# Patient Record
Sex: FEMALE | Race: AMERICAN INDIAN OR ALASKA NATIVE | NOT HISPANIC OR LATINO | Employment: FULL TIME | URBAN - METROPOLITAN AREA
[De-identification: names, ages, dates, MRNs, and addresses within clinical notes are randomized per-mention and may not be internally consistent; named-entity substitution may affect disease eponyms.]

---

## 2021-02-25 PROBLEM — O26.853 SPOTTING COMPLICATING PREGNANCY, THIRD TRIMESTER: Status: ACTIVE | Noted: 2021-02-25

## 2021-03-03 PROBLEM — O26.893 ABDOMINAL PAIN DURING PREGNANCY IN THIRD TRIMESTER: Status: ACTIVE | Noted: 2021-03-03

## 2021-03-03 PROBLEM — R10.9 ABDOMINAL PAIN DURING PREGNANCY IN THIRD TRIMESTER: Status: ACTIVE | Noted: 2021-03-03

## 2021-03-14 PROBLEM — M25.559 HIP PAIN: Status: ACTIVE | Noted: 2021-03-14

## 2021-03-27 PROBLEM — O47.00 PRETERM UTERINE CONTRACTIONS: Status: ACTIVE | Noted: 2021-03-27

## 2021-04-20 PROBLEM — O47.00 PRETERM UTERINE CONTRACTIONS: Status: RESOLVED | Noted: 2021-03-27 | Resolved: 2021-04-20

## 2021-04-20 PROBLEM — O26.893 ABDOMINAL PAIN DURING PREGNANCY IN THIRD TRIMESTER: Status: RESOLVED | Noted: 2021-03-03 | Resolved: 2021-04-20

## 2021-04-20 PROBLEM — O26.853 SPOTTING COMPLICATING PREGNANCY, THIRD TRIMESTER: Status: RESOLVED | Noted: 2021-02-25 | Resolved: 2021-04-20

## 2021-04-20 PROBLEM — M25.559 HIP PAIN: Status: RESOLVED | Noted: 2021-03-14 | Resolved: 2021-04-20

## 2021-04-20 PROBLEM — R10.9 ABDOMINAL PAIN DURING PREGNANCY IN THIRD TRIMESTER: Status: RESOLVED | Noted: 2021-03-03 | Resolved: 2021-04-20

## 2021-05-06 ENCOUNTER — PATIENT MESSAGE (OUTPATIENT)
Dept: RESEARCH | Facility: HOSPITAL | Age: 21
End: 2021-05-06

## 2022-04-10 ENCOUNTER — HISTORICAL (OUTPATIENT)
Dept: ADMINISTRATIVE | Facility: HOSPITAL | Age: 22
End: 2022-04-10

## 2022-04-29 VITALS — HEIGHT: 66 IN | WEIGHT: 196.19 LBS | BODY MASS INDEX: 31.53 KG/M2

## 2023-06-18 ENCOUNTER — HOSPITAL ENCOUNTER (EMERGENCY)
Facility: HOSPITAL | Age: 23
Discharge: HOME | End: 2023-06-18
Attending: FAMILY MEDICINE
Payer: MEDICAID

## 2023-06-18 VITALS
DIASTOLIC BLOOD PRESSURE: 77 MMHG | OXYGEN SATURATION: 97 % | BODY MASS INDEX: 33.32 KG/M2 | HEIGHT: 65 IN | SYSTOLIC BLOOD PRESSURE: 109 MMHG | HEART RATE: 117 BPM | TEMPERATURE: 100 F | WEIGHT: 200 LBS | RESPIRATION RATE: 20 BRPM

## 2023-06-18 DIAGNOSIS — J02.0 STREP PHARYNGITIS: Primary | ICD-10-CM

## 2023-06-18 LAB
B-HCG SERPL QL: NEGATIVE
FLUAV AG UPPER RESP QL IA.RAPID: NOT DETECTED
FLUBV AG UPPER RESP QL IA.RAPID: NOT DETECTED
SARS-COV-2 RNA RESP QL NAA+PROBE: NOT DETECTED
STREP A PCR (OHS): DETECTED

## 2023-06-18 PROCEDURE — 25000003 PHARM REV CODE 250: Performed by: FAMILY MEDICINE

## 2023-06-18 PROCEDURE — 99284 EMERGENCY DEPT VISIT MOD MDM: CPT

## 2023-06-18 PROCEDURE — 87651 STREP A DNA AMP PROBE: CPT | Performed by: FAMILY MEDICINE

## 2023-06-18 PROCEDURE — 0240U COVID/FLU A&B PCR: CPT | Performed by: FAMILY MEDICINE

## 2023-06-18 PROCEDURE — 81025 URINE PREGNANCY TEST: CPT | Performed by: FAMILY MEDICINE

## 2023-06-18 RX ORDER — ONDANSETRON 4 MG/1
4 TABLET, ORALLY DISINTEGRATING ORAL EVERY 6 HOURS PRN
Qty: 10 TABLET | Refills: 0 | Status: SHIPPED | OUTPATIENT
Start: 2023-06-18 | End: 2023-06-23

## 2023-06-18 RX ORDER — AMOXICILLIN 500 MG/1
1000 CAPSULE ORAL DAILY
Qty: 20 CAPSULE | Refills: 0 | Status: SHIPPED | OUTPATIENT
Start: 2023-06-18 | End: 2023-06-28

## 2023-06-18 RX ORDER — ONDANSETRON 4 MG/1
4 TABLET, ORALLY DISINTEGRATING ORAL
Status: COMPLETED | OUTPATIENT
Start: 2023-06-18 | End: 2023-06-18

## 2023-06-18 RX ADMIN — ONDANSETRON 4 MG: 4 TABLET, ORALLY DISINTEGRATING ORAL at 08:06

## 2023-06-18 NOTE — Clinical Note
"Georgina"Brittney Larsen was seen and treated in our emergency department on 6/18/2023.  She may return to work on 06/22/2023.       If you have any questions or concerns, please don't hesitate to call.      Sebastian Viveros MD"

## 2023-06-19 NOTE — ED PROVIDER NOTES
Encounter Date: 6/18/2023       History     Chief Complaint   Patient presents with    Generalized Body Aches     Pt states she started feeling lethargic, nauseated and feeling a slight sore throat this afternoon. Took tylenol around 12pm. Low grade temp in triage. States he daughter was tested on Friday for covid and strep and was positive for both.      Patient is a 22-year-old female presents emergency room complaints of fatigue, nausea, low-grade fever, body aches.  Symptoms began around 12:00 p.m..  Reports that her daughter was recently diagnosed with strep and COVID.  Patient reports nonspecific abdominal cramping as well as having diarrhea.  Denies any chronic medical problems.    The history is provided by the patient.   Review of patient's allergies indicates:  No Known Allergies  History reviewed. No pertinent past medical history.  Past Surgical History:   Procedure Laterality Date    ANKLE SURGERY       Family History   Problem Relation Age of Onset    Hypertension Mother     Cancer Mother     Hypertension Father     Stroke Maternal Grandmother     Cancer Maternal Grandmother     Seizures Maternal Grandmother     Hypertension Paternal Grandmother      Social History     Tobacco Use    Smoking status: Never    Smokeless tobacco: Never   Substance Use Topics    Alcohol use: No    Drug use: No     Review of Systems   Constitutional:  Positive for chills, fatigue and fever.   HENT:  Positive for sore throat. Negative for ear pain and rhinorrhea.    Eyes:  Negative for photophobia and pain.   Respiratory:  Negative for cough, shortness of breath and wheezing.    Cardiovascular:  Negative for chest pain.   Gastrointestinal:  Positive for diarrhea, nausea and vomiting. Negative for abdominal pain.   Genitourinary:  Negative for dysuria.   Neurological:  Negative for dizziness, weakness and headaches.   All other systems reviewed and are negative.    Physical Exam     Initial Vitals [06/18/23 1957]   BP Pulse  Resp Temp SpO2   109/77 (!) 117 20 100.2 °F (37.9 °C) 97 %      MAP       --         Physical Exam    Nursing note and vitals reviewed.  Constitutional: She appears well-developed and well-nourished. No distress.   HENT:   Head: Normocephalic and atraumatic.   Mild pharyngeal erythema bilaterally with bilateral tonsillar exudates   Eyes: Conjunctivae and EOM are normal. Pupils are equal, round, and reactive to light.   Neck: Neck supple.   Normal range of motion.  Cardiovascular:  Normal rate, regular rhythm, normal heart sounds and intact distal pulses.           Pulmonary/Chest: Breath sounds normal. No respiratory distress. She has no wheezes. She has no rhonchi. She has no rales.   Abdominal: Abdomen is soft. Bowel sounds are normal. She exhibits no distension. There is no abdominal tenderness. There is no rebound and no guarding.   Musculoskeletal:         General: Normal range of motion.      Cervical back: Normal range of motion and neck supple.     Neurological: She is alert and oriented to person, place, and time.   Skin: Skin is warm and dry. Capillary refill takes less than 2 seconds. No erythema.   Psychiatric: She has a normal mood and affect. Her behavior is normal. Judgment and thought content normal.       ED Course   Procedures  Labs Reviewed   STREP GROUP A BY PCR - Abnormal; Notable for the following components:       Result Value    STREP A PCR ([facility]) Detected (*)     All other components within normal limits    Narrative:     The Xpert Xpress Strep A test is a rapid, qualitative in vitro diagnostic test for the detection of Streptococcus pyogenes (Group A ß-hemolytic Streptococcus, Strep A) in throat swab specimens from patients with signs and symptoms of pharyngitis.     COVID/FLU A&B PCR - Normal    Narrative:     The Xpert Xpress SARS-CoV-2/FLU/RSV plus is a rapid, multiplexed real-time PCR test intended for the simultaneous qualitative detection and differentiation of SARS-CoV-2, Influenza A,  Influenza B, and respiratory syncytial virus (RSV) viral RNA in either nasopharyngeal swab or nasal swab specimens.         PREGNANCY TEST, URINE RAPID - Normal          Imaging Results    None          Medications   ondansetron disintegrating tablet 4 mg (4 mg Oral Given 6/18/23 2013)     Medical Decision Making:   Initial Assessment:   Patient is a 22-year-old female presents emergency room with complaints of body aches, fatigue, nausea, diarrhea, abdominal cramping, sore throat.  Patient reports her daughter has strep and COVID-19.  Findings likely related to a viral illness, but will also check patient for strep pharyngitis due to tonsillar exudate.  Overall, patient appears nontoxic.  She is tachycardic at 117 beats per minute, and current temperature is 100.2°.  Discussed with the patient about receiving IV fluids, but patient has opted for oral nausea medicine and will try and reach hydrate orally.  Will continue to monitor.  Differential Diagnosis:   Pregnancy, viral syndrome, COVID-19, influenza, strep pharyngitis, gastroenteritis           ED Course as of 06/18/23 2115   Sun Jun 18, 2023 2036 STREP A PCR ([facility])(!): Detected [MW]   2109 STREP A PCR ([facility])(!): Detected [MW]   2109 SARS-CoV2 (COVID-19) Qualitative PCR: Not Detected [MW]   2109 Influenza B, Molecular: Not Detected [MW]   2109 Influenza A, Molecular: Not Detected [MW]   2115 Feeling improved; discussed results.  Stable for discharge to home.  ER precautions for any acute worsening. [MW]      ED Course User Index  [MW] Sebastian Viveros MD                 Clinical Impression:   Final diagnoses:  [J02.0] Strep pharyngitis (Primary)        ED Disposition Condition    Discharge Stable          ED Prescriptions       Medication Sig Dispense Start Date End Date Auth. Provider    amoxicillin (AMOXIL) 500 MG capsule Take 2 capsules (1,000 mg total) by mouth once daily. for 10 days 20 capsule 6/18/2023 6/28/2023 Sebastian Viveros MD     ondansetron (ZOFRAN-ODT) 4 MG TbDL Take 1 tablet (4 mg total) by mouth every 6 (six) hours as needed (nausea vomiting). 10 tablet 6/18/2023 6/23/2023 Sebastian Viveros MD          Follow-up Information       Follow up With Specialties Details Why Contact Info    [facility] - Emergency Dept Emergency Medicine  As needed, If symptoms worsen [address]  [facility] [phone]    Primary Care Physician  In 5 days               Sebastian Viveros MD  06/18/23 0283

## 2025-03-28 ENCOUNTER — HOSPITAL ENCOUNTER (EMERGENCY)
Facility: HOSPITAL | Age: 25
Discharge: HOME | End: 2025-03-28
Attending: STUDENT IN AN ORGANIZED HEALTH CARE EDUCATION/TRAINING PROGRAM
Payer: COMMERCIAL

## 2025-03-28 VITALS
HEIGHT: 66 IN | DIASTOLIC BLOOD PRESSURE: 67 MMHG | WEIGHT: 191 LBS | TEMPERATURE: 98 F | OXYGEN SATURATION: 100 % | HEART RATE: 74 BPM | BODY MASS INDEX: 30.7 KG/M2 | SYSTOLIC BLOOD PRESSURE: 109 MMHG | RESPIRATION RATE: 20 BRPM

## 2025-03-28 DIAGNOSIS — O21.9 NAUSEA AND VOMITING IN PREGNANCY: Primary | ICD-10-CM

## 2025-03-28 DIAGNOSIS — Z3A.09 9 WEEKS GESTATION OF PREGNANCY: ICD-10-CM

## 2025-03-28 LAB
ALBUMIN SERPL-MCNC: 4.1 G/DL (ref 3.5–5)
ALBUMIN/GLOB SERPL: 1 RATIO (ref 1.1–2)
ALP SERPL-CCNC: 50 UNIT/L (ref 40–150)
ALT SERPL-CCNC: 12 UNIT/L (ref 0–55)
ANION GAP SERPL CALC-SCNC: 10 MEQ/L
AST SERPL-CCNC: 16 UNIT/L (ref 11–45)
B-HCG FREE SERPL-ACNC: ABNORMAL MIU/ML
BASOPHILS # BLD AUTO: 0.06 X10(3)/MCL
BASOPHILS NFR BLD AUTO: 0.5 %
BILIRUB SERPL-MCNC: 0.9 MG/DL
BUN SERPL-MCNC: 9 MG/DL (ref 7–18.7)
CALCIUM SERPL-MCNC: 9.8 MG/DL (ref 8.4–10.2)
CHLORIDE SERPL-SCNC: 106 MMOL/L (ref 98–107)
CO2 SERPL-SCNC: 25 MMOL/L (ref 22–29)
CREAT SERPL-MCNC: 0.71 MG/DL (ref 0.55–1.02)
CREAT/UREA NIT SERPL: 13
EOSINOPHIL # BLD AUTO: 0.37 X10(3)/MCL (ref 0–0.9)
EOSINOPHIL NFR BLD AUTO: 3.2 %
ERYTHROCYTE [DISTWIDTH] IN BLOOD BY AUTOMATED COUNT: 12.6 % (ref 11.5–17)
GFR SERPLBLD CREATININE-BSD FMLA CKD-EPI: >60 ML/MIN/1.73/M2
GLOBULIN SER-MCNC: 4 GM/DL (ref 2.4–3.5)
GLUCOSE SERPL-MCNC: 86 MG/DL (ref 74–100)
HCT VFR BLD AUTO: 41.3 % (ref 37–47)
HGB BLD-MCNC: 13.6 G/DL (ref 12–16)
IMM GRANULOCYTES # BLD AUTO: 0.02 X10(3)/MCL (ref 0–0.04)
IMM GRANULOCYTES NFR BLD AUTO: 0.2 %
LYMPHOCYTES # BLD AUTO: 2.99 X10(3)/MCL (ref 0.6–4.6)
LYMPHOCYTES NFR BLD AUTO: 26.3 %
MCH RBC QN AUTO: 29.1 PG (ref 27–31)
MCHC RBC AUTO-ENTMCNC: 32.9 G/DL (ref 33–36)
MCV RBC AUTO: 88.2 FL (ref 80–94)
MONOCYTES # BLD AUTO: 0.6 X10(3)/MCL (ref 0.1–1.3)
MONOCYTES NFR BLD AUTO: 5.3 %
NEUTROPHILS # BLD AUTO: 7.35 X10(3)/MCL (ref 2.1–9.2)
NEUTROPHILS NFR BLD AUTO: 64.5 %
NRBC BLD AUTO-RTO: 0 %
PLATELET # BLD AUTO: 223 X10(3)/MCL (ref 130–400)
PMV BLD AUTO: 12.1 FL (ref 7.4–10.4)
POTASSIUM SERPL-SCNC: 4.4 MMOL/L (ref 3.5–5.1)
PROT SERPL-MCNC: 8.1 GM/DL (ref 6.4–8.3)
RBC # BLD AUTO: 4.68 X10(6)/MCL (ref 4.2–5.4)
SODIUM SERPL-SCNC: 141 MMOL/L (ref 136–145)
WBC # BLD AUTO: 11.39 X10(3)/MCL (ref 4.5–11.5)

## 2025-03-28 PROCEDURE — 80053 COMPREHEN METABOLIC PANEL: CPT | Performed by: STUDENT IN AN ORGANIZED HEALTH CARE EDUCATION/TRAINING PROGRAM

## 2025-03-28 PROCEDURE — 96374 THER/PROPH/DIAG INJ IV PUSH: CPT

## 2025-03-28 PROCEDURE — 99284 EMERGENCY DEPT VISIT MOD MDM: CPT | Mod: 25

## 2025-03-28 PROCEDURE — 85025 COMPLETE CBC W/AUTO DIFF WBC: CPT | Performed by: STUDENT IN AN ORGANIZED HEALTH CARE EDUCATION/TRAINING PROGRAM

## 2025-03-28 PROCEDURE — 96375 TX/PRO/DX INJ NEW DRUG ADDON: CPT

## 2025-03-28 PROCEDURE — 84702 CHORIONIC GONADOTROPIN TEST: CPT | Performed by: STUDENT IN AN ORGANIZED HEALTH CARE EDUCATION/TRAINING PROGRAM

## 2025-03-28 PROCEDURE — 63600175 PHARM REV CODE 636 W HCPCS: Performed by: STUDENT IN AN ORGANIZED HEALTH CARE EDUCATION/TRAINING PROGRAM

## 2025-03-28 RX ORDER — METOCLOPRAMIDE 10 MG/1
10 TABLET ORAL EVERY 8 HOURS PRN
Qty: 15 TABLET | Refills: 0 | Status: SHIPPED | OUTPATIENT
Start: 2025-03-28 | End: 2025-03-28

## 2025-03-28 RX ORDER — ONDANSETRON 4 MG/1
4 TABLET, ORALLY DISINTEGRATING ORAL EVERY 8 HOURS PRN
Qty: 20 TABLET | Refills: 0 | Status: SHIPPED | OUTPATIENT
Start: 2025-03-28 | End: 2025-03-28 | Stop reason: ALTCHOICE

## 2025-03-28 RX ORDER — METOCLOPRAMIDE HYDROCHLORIDE 5 MG/ML
10 INJECTION INTRAMUSCULAR; INTRAVENOUS
Status: COMPLETED | OUTPATIENT
Start: 2025-03-28 | End: 2025-03-28

## 2025-03-28 RX ORDER — ONDANSETRON 8 MG/1
8 TABLET, ORALLY DISINTEGRATING ORAL EVERY 12 HOURS PRN
Qty: 20 TABLET | Refills: 0 | Status: SHIPPED | OUTPATIENT
Start: 2025-03-28 | End: 2025-04-07

## 2025-03-28 RX ORDER — METOCLOPRAMIDE 5 MG/1
5 TABLET ORAL EVERY 8 HOURS PRN
Qty: 15 TABLET | Refills: 0 | Status: SHIPPED | OUTPATIENT
Start: 2025-03-28 | End: 2025-03-28

## 2025-03-28 RX ORDER — ONDANSETRON HYDROCHLORIDE 2 MG/ML
4 INJECTION, SOLUTION INTRAVENOUS
Status: COMPLETED | OUTPATIENT
Start: 2025-03-28 | End: 2025-03-28

## 2025-03-28 RX ORDER — METOCLOPRAMIDE 10 MG/1
10 TABLET ORAL EVERY 8 HOURS PRN
Qty: 20 TABLET | Refills: 0 | Status: SHIPPED | OUTPATIENT
Start: 2025-03-28 | End: 2025-04-07

## 2025-03-28 RX ORDER — DIPHENHYDRAMINE HYDROCHLORIDE 50 MG/ML
50 INJECTION, SOLUTION INTRAMUSCULAR; INTRAVENOUS
Status: COMPLETED | OUTPATIENT
Start: 2025-03-28 | End: 2025-03-28

## 2025-03-28 RX ORDER — DIPHENHYDRAMINE HCL 50 MG
50 CAPSULE ORAL EVERY 8 HOURS PRN
Qty: 20 CAPSULE | Refills: 0 | Status: SHIPPED | OUTPATIENT
Start: 2025-03-28 | End: 2025-04-07

## 2025-03-28 RX ADMIN — METOCLOPRAMIDE 10 MG: 5 INJECTION, SOLUTION INTRAMUSCULAR; INTRAVENOUS at 07:03

## 2025-03-28 RX ADMIN — SODIUM CHLORIDE, POTASSIUM CHLORIDE, SODIUM LACTATE AND CALCIUM CHLORIDE 3000 ML: 600; 310; 30; 20 INJECTION, SOLUTION INTRAVENOUS at 06:03

## 2025-03-28 RX ADMIN — DIPHENHYDRAMINE HYDROCHLORIDE 50 MG: 50 INJECTION INTRAMUSCULAR; INTRAVENOUS at 07:03

## 2025-03-28 RX ADMIN — ONDANSETRON 4 MG: 2 INJECTION INTRAMUSCULAR; INTRAVENOUS at 06:03

## 2025-03-28 NOTE — ED PROVIDER NOTES
Encounter Date: 3/28/2025    SCRIBE #1 NOTE: I, Cassie Elder, am scribing for, and in the presence of,  Germán Talavera MD. I have scribed the following portions of the note - Other sections scribed: HPI, ROS, PE.       History     Chief Complaint   Patient presents with    Emesis During Pregnancy     Pt.  9 weeks pregnant, C/O frequent vomiting after all PO intake x 3 days. OB: Dinah Villeda. No relief w/ Diclegis,      Patient is a 25 y/o female  presents to the ED for emesis during pregnancy. Patient reports being 9 weeks pregnant. She reports intermittent abdominal bloating/pain and inabilty to tolerate PO for 3 days. She reports having similar problems during her first pregnancy. She reports feeling dehydrated. She reports taking Diclegis without relief.    OB: Dr. Dinah Villeda.    The history is provided by the patient. No  was used.     Review of patient's allergies indicates:  No Known Allergies  Past Medical History:   Diagnosis Date    Known health problems: none      Past Surgical History:   Procedure Laterality Date    ANKLE SURGERY       Family History   Problem Relation Name Age of Onset    Hypertension Mother      Cancer Mother      Hypertension Father      Stroke Maternal Grandmother      Cancer Maternal Grandmother      Seizures Maternal Grandmother      Hypertension Paternal Grandmother       Social History[1]  Review of Systems   Constitutional:  Negative for fever.   HENT:  Negative for sore throat.    Eyes:  Negative for visual disturbance.   Respiratory:  Negative for shortness of breath.    Cardiovascular:  Negative for chest pain.   Gastrointestinal:  Positive for abdominal pain (intermittent), nausea and vomiting.   Genitourinary:  Negative for dysuria.   Musculoskeletal:  Negative for joint swelling.   Skin:  Negative for rash.   Neurological:  Negative for weakness.   Psychiatric/Behavioral:  Negative for confusion.        Physical Exam     Initial Vitals  [03/28/25 0556]   BP Pulse Resp Temp SpO2   114/80 93 16 98 °F (36.7 °C) 99 %      MAP       --         Physical Exam    Nursing note and vitals reviewed.  Constitutional: She appears well-developed and well-nourished.   HENT:   Head: Normocephalic and atraumatic.   Eyes: EOM are normal. Pupils are equal, round, and reactive to light.   Neck:   Normal range of motion.  Cardiovascular:  Normal rate, regular rhythm, normal heart sounds and intact distal pulses.           No murmur heard.  Pulmonary/Chest: Breath sounds normal. No respiratory distress. She has no wheezes. She has no rales.   Abdominal: Abdomen is soft. She exhibits no distension. There is no abdominal tenderness. There is no rebound.   Musculoskeletal:         General: No tenderness or edema. Normal range of motion.      Cervical back: Normal range of motion.     Neurological: She is alert. She has normal strength. No cranial nerve deficit. GCS score is 15. GCS eye subscore is 4. GCS verbal subscore is 5. GCS motor subscore is 6.   Skin: Skin is warm and dry. Capillary refill takes less than 2 seconds. No rash noted. No erythema.   Psychiatric: She has a normal mood and affect.         ED Course   Procedures  Labs Reviewed   COMPREHENSIVE METABOLIC PANEL - Abnormal       Result Value    Sodium 141      Potassium 4.4      Chloride 106      CO2 25      Glucose 86      Blood Urea Nitrogen 9.0      Creatinine 0.71      Calcium 9.8      Protein Total 8.1      Albumin 4.1      Globulin 4.0 (*)     Albumin/Globulin Ratio 1.0 (*)     Bilirubin Total 0.9      ALP 50      ALT 12      AST 16      eGFR >60      Anion Gap 10.0      BUN/Creatinine Ratio 13     CBC WITH DIFFERENTIAL - Abnormal    WBC 11.39      RBC 4.68      Hgb 13.6      Hct 41.3      MCV 88.2      MCH 29.1      MCHC 32.9 (*)     RDW 12.6      Platelet 223      MPV 12.1 (*)     Neut % 64.5      Lymph % 26.3      Mono % 5.3      Eos % 3.2      Basophil % 0.5      Imm Grans % 0.2      Neut # 7.35       Lymph # 2.99      Mono # 0.60      Eos # 0.37      Baso # 0.06      Imm Gran # 0.02      NRBC% 0.0     HCG, QUANTITATIVE - Abnormal    Beta HCG Quant 278,859.16 (*)    CBC W/ AUTO DIFFERENTIAL    Narrative:     The following orders were created for panel order CBC auto differential.  Procedure                               Abnormality         Status                     ---------                               -----------         ------                     CBC with Differential[[id number]]        Abnormal            Final result                 Please view results for these tests on the individual orders.          Imaging Results    None          Medications   lactated ringers bolus 3,000 mL (0 mLs Intravenous Stopped 3/28/25 0736)   ondansetron injection 4 mg (4 mg Intravenous Given 3/28/25 0636)   diphenhydrAMINE injection 50 mg (50 mg Intravenous Given 3/28/25 0741)   metoclopramide injection 10 mg (10 mg Intravenous Given 3/28/25 0742)     Medical Decision Making  Judging by the patient's chief complaint and pertinent history, the patient has the following possible differential diagnoses, including but not limited to the following.  Some of these are deemed to be lower likelihood and some more likely based on my physical exam and history combined with possible lab work and/or imaging studies.   Please see the pertinent studies, and refer to the HPI.  Some of these diagnoses will take further evaluation to fully rule out, perhaps as an outpatient and the patient was encouraged to follow up when discharged for more comprehensive evaluation.      Hyperemesis gravidarum, nausea, vomiting, nausea pregnancy, gastritis, gastroenteritis     Patient is a 24-year-old female presents to emergency department for nausea, vomiting.  See HPI.  See physical exam.  Given IV fluids, Reglan and Benadryl with improvement.  On reassessment resting comfortably, no acute distress.  Reassessed patient.  Patient is resting comfortably.   Discussed all results.  Discussed need for follow-up with OBGYN.  Discussed return precautions.  Answered all questions at this time.  Hemodynamically stable for continued outpatient management with strict return precautions.  Patient and family verbalized understanding agreed to plan.      Problems Addressed:  9 weeks gestation of pregnancy: acute illness or injury that poses a threat to life or bodily functions  Nausea and vomiting in pregnancy: acute illness or injury that poses a threat to life or bodily functions    Amount and/or Complexity of Data Reviewed  Labs: ordered.    Risk  OTC drugs.  Prescription drug management.  Decision regarding hospitalization.            Scribe Attestation:   Scribe #1: I performed the above scribed service and the documentation accurately describes the services I performed. I attest to the accuracy of the note.    Attending Attestation:           Physician Attestation for Scribe:  Physician Attestation Statement for Scribe #1: I, Germán Talavera MD, reviewed documentation, as scribed by Cassie Elder in my presence, and it is both accurate and complete.                                    Clinical Impression:  Final diagnoses:  [O21.9] Nausea and vomiting in pregnancy (Primary)  [Z3A.09] 9 weeks gestation of pregnancy          ED Disposition Condition    Discharge           ED Prescriptions       Medication Sig Dispense Start Date End Date Auth. Provider    ondansetron (ZOFRAN-ODT) 4 MG TbDL  (Status: Discontinued) Take 1 tablet (4 mg total) by mouth every 8 (eight) hours as needed. 20 tablet 3/28/2025 3/28/2025 Germán Talavera MD    metoclopramide HCl (REGLAN) 5 MG tablet  (Status: Discontinued) Take 1 tablet (5 mg total) by mouth every 8 (eight) hours as needed (Nausea/vomiting). 15 tablet 3/28/2025 3/28/2025 Germán Talavera MD    metoclopramide HCl (REGLAN) 10 MG tablet  (Status: Discontinued) Take 1 tablet (10 mg total) by mouth every 8 (eight) hours as needed  (Nausea/vomiting). 15 tablet 3/28/2025 3/28/2025 Germán Talavera MD    ondansetron (ZOFRAN-ODT) 8 MG TbDL () Take 1 tablet (8 mg total) by mouth every 12 (twelve) hours as needed (nausea/vomiting). 20 tablet 3/28/2025 2025 Germán Talavera MD    diphenhydrAMINE (BENADRYL) 50 MG capsule () Take 1 capsule (50 mg total) by mouth every 8 (eight) hours as needed (Take wtih reglan for nausea). 20 capsule 3/28/2025 2025 Germán Talavera MD    metoclopramide HCl (REGLAN) 10 MG tablet () Take 1 tablet (10 mg total) by mouth every 8 (eight) hours as needed (Nausea/vomiting, take with benadryl). 20 tablet 3/28/2025 2025 Germán Talavera MD          Follow-up Information       Follow up With Specialties Details Why Contact Info    Dinah Villeda MD [facility]   [address] [facility]  [address]  [place] [address]  [phone]      Primary Care  Call in 1 day  Please call [phone] for a primary care provider.                 [1]   Social History  Tobacco Use    Smoking status: Never    Smokeless tobacco: Never   Substance Use Topics    Alcohol use: No    Drug use: No        Germán Talavera MD  25 [phone]

## 2025-03-28 NOTE — DISCHARGE INSTRUCTIONS
Follow-up with your OBGYN.      Continue taking diclegis as needed for nausea and vomiting.      When you need to be awake you may take Zofran as needed for nausea and vomiting as this is nondrowsy.    You may take take Reglan with Benadryl as needed for nausea and vomiting.      Do not take both Reglan and Zofran at the same time.    Return to the emergency department if any new or worsening symptoms.

## 2025-04-05 ENCOUNTER — HOSPITAL ENCOUNTER (EMERGENCY)
Facility: HOSPITAL | Age: 25
Discharge: HOME | End: 2025-04-06
Attending: STUDENT IN AN ORGANIZED HEALTH CARE EDUCATION/TRAINING PROGRAM
Payer: COMMERCIAL

## 2025-04-05 DIAGNOSIS — O21.9 NAUSEA AND VOMITING DURING PREGNANCY: Primary | ICD-10-CM

## 2025-04-05 DIAGNOSIS — N39.0 URINARY TRACT INFECTION WITHOUT HEMATURIA, SITE UNSPECIFIED: ICD-10-CM

## 2025-04-05 LAB
ALBUMIN SERPL-MCNC: 3.5 G/DL (ref 3.5–5)
ALBUMIN/GLOB SERPL: 1 RATIO (ref 1.1–2)
ALP SERPL-CCNC: 38 UNIT/L (ref 40–150)
ALT SERPL-CCNC: 14 UNIT/L (ref 0–55)
ANION GAP SERPL CALC-SCNC: 9 MEQ/L
AST SERPL-CCNC: 15 UNIT/L (ref 11–45)
BACTERIA #/AREA URNS AUTO: ABNORMAL /HPF
BASOPHILS # BLD AUTO: 0.04 X10(3)/MCL
BASOPHILS NFR BLD AUTO: 0.4 %
BILIRUB SERPL-MCNC: 1 MG/DL
BILIRUB UR QL STRIP.AUTO: ABNORMAL
BUN SERPL-MCNC: 7.2 MG/DL (ref 7–18.7)
CALCIUM SERPL-MCNC: 8.3 MG/DL (ref 8.4–10.2)
CHLORIDE SERPL-SCNC: 110 MMOL/L (ref 98–107)
CLARITY UR: ABNORMAL
CO2 SERPL-SCNC: 19 MMOL/L (ref 22–29)
COLOR UR AUTO: ABNORMAL
CREAT SERPL-MCNC: 0.61 MG/DL (ref 0.55–1.02)
CREAT/UREA NIT SERPL: 12
EOSINOPHIL # BLD AUTO: 0.11 X10(3)/MCL (ref 0–0.9)
EOSINOPHIL NFR BLD AUTO: 1.2 %
ERYTHROCYTE [DISTWIDTH] IN BLOOD BY AUTOMATED COUNT: 12.4 % (ref 11.5–17)
GFR SERPLBLD CREATININE-BSD FMLA CKD-EPI: >60 ML/MIN/1.73/M2
GLOBULIN SER-MCNC: 3.5 GM/DL (ref 2.4–3.5)
GLUCOSE SERPL-MCNC: 80 MG/DL (ref 74–100)
GLUCOSE UR QL STRIP: NEGATIVE
HCT VFR BLD AUTO: 36.4 % (ref 37–47)
HGB BLD-MCNC: 12.4 G/DL (ref 12–16)
HGB UR QL STRIP: NEGATIVE
IMM GRANULOCYTES # BLD AUTO: 0.02 X10(3)/MCL (ref 0–0.04)
IMM GRANULOCYTES NFR BLD AUTO: 0.2 %
KETONES UR QL STRIP: ABNORMAL
LEUKOCYTE ESTERASE UR QL STRIP: ABNORMAL
LYMPHOCYTES # BLD AUTO: 2.81 X10(3)/MCL (ref 0.6–4.6)
LYMPHOCYTES NFR BLD AUTO: 31.6 %
MAGNESIUM SERPL-MCNC: 1.8 MG/DL (ref 1.6–2.6)
MCH RBC QN AUTO: 29.5 PG (ref 27–31)
MCHC RBC AUTO-ENTMCNC: 34.1 G/DL (ref 33–36)
MCV RBC AUTO: 86.7 FL (ref 80–94)
MONOCYTES # BLD AUTO: 0.49 X10(3)/MCL (ref 0.1–1.3)
MONOCYTES NFR BLD AUTO: 5.5 %
MUCOUS THREADS URNS QL MICRO: ABNORMAL /LPF
NEUTROPHILS # BLD AUTO: 5.43 X10(3)/MCL (ref 2.1–9.2)
NEUTROPHILS NFR BLD AUTO: 61.1 %
NITRITE UR QL STRIP: NEGATIVE
NRBC BLD AUTO-RTO: 0 %
PH UR STRIP: 6.5 [PH]
PLATELET # BLD AUTO: 214 X10(3)/MCL (ref 130–400)
PMV BLD AUTO: 12 FL (ref 7.4–10.4)
POTASSIUM SERPL-SCNC: 3.5 MMOL/L (ref 3.5–5.1)
PROT SERPL-MCNC: 7 GM/DL (ref 6.4–8.3)
PROT UR QL STRIP: ABNORMAL
RBC # BLD AUTO: 4.2 X10(6)/MCL (ref 4.2–5.4)
RBC #/AREA URNS AUTO: ABNORMAL /HPF
SODIUM SERPL-SCNC: 138 MMOL/L (ref 136–145)
SP GR UR STRIP.AUTO: 1.02 (ref 1–1.03)
SQUAMOUS #/AREA URNS LPF: ABNORMAL /HPF
UROBILINOGEN UR STRIP-ACNC: 2
WBC # BLD AUTO: 8.9 X10(3)/MCL (ref 4.5–11.5)
WBC #/AREA URNS AUTO: ABNORMAL /HPF

## 2025-04-05 PROCEDURE — 81001 URINALYSIS AUTO W/SCOPE: CPT

## 2025-04-05 PROCEDURE — 96372 THER/PROPH/DIAG INJ SC/IM: CPT | Performed by: NURSE PRACTITIONER

## 2025-04-05 PROCEDURE — 63600175 PHARM REV CODE 636 W HCPCS

## 2025-04-05 PROCEDURE — 80053 COMPREHEN METABOLIC PANEL: CPT

## 2025-04-05 PROCEDURE — 85025 COMPLETE CBC W/AUTO DIFF WBC: CPT

## 2025-04-05 PROCEDURE — 99284 EMERGENCY DEPT VISIT MOD MDM: CPT | Mod: 25

## 2025-04-05 PROCEDURE — 36415 COLL VENOUS BLD VENIPUNCTURE: CPT

## 2025-04-05 PROCEDURE — 83735 ASSAY OF MAGNESIUM: CPT

## 2025-04-05 PROCEDURE — 96374 THER/PROPH/DIAG INJ IV PUSH: CPT

## 2025-04-05 PROCEDURE — 87086 URINE CULTURE/COLONY COUNT: CPT

## 2025-04-05 PROCEDURE — 96361 HYDRATE IV INFUSION ADD-ON: CPT

## 2025-04-05 PROCEDURE — 63600175 PHARM REV CODE 636 W HCPCS: Performed by: NURSE PRACTITIONER

## 2025-04-05 RX ORDER — PROMETHAZINE HYDROCHLORIDE 25 MG/1
25 TABLET ORAL EVERY 6 HOURS PRN
Qty: 15 TABLET | Refills: 0 | Status: SHIPPED | OUTPATIENT
Start: 2025-04-05

## 2025-04-05 RX ORDER — CEPHALEXIN 500 MG/1
500 CAPSULE ORAL 4 TIMES DAILY
Qty: 28 CAPSULE | Refills: 0 | Status: SHIPPED | OUTPATIENT
Start: 2025-04-05 | End: 2025-04-12

## 2025-04-05 RX ORDER — PROMETHAZINE HYDROCHLORIDE 25 MG/ML
25 INJECTION, SOLUTION INTRAMUSCULAR; INTRAVENOUS
Status: COMPLETED | OUTPATIENT
Start: 2025-04-05 | End: 2025-04-05

## 2025-04-05 RX ORDER — METOCLOPRAMIDE HYDROCHLORIDE 5 MG/ML
10 INJECTION INTRAMUSCULAR; INTRAVENOUS
Status: COMPLETED | OUTPATIENT
Start: 2025-04-05 | End: 2025-04-05

## 2025-04-05 RX ADMIN — SODIUM CHLORIDE, POTASSIUM CHLORIDE, SODIUM LACTATE AND CALCIUM CHLORIDE 1000 ML: 600; 310; 30; 20 INJECTION, SOLUTION INTRAVENOUS at 09:04

## 2025-04-05 RX ADMIN — PROMETHAZINE HYDROCHLORIDE 25 MG: 25 INJECTION INTRAMUSCULAR; INTRAVENOUS at 09:04

## 2025-04-05 RX ADMIN — METOCLOPRAMIDE 10 MG: 5 INJECTION, SOLUTION INTRAMUSCULAR; INTRAVENOUS at 08:04

## 2025-04-05 RX ADMIN — SODIUM CHLORIDE, POTASSIUM CHLORIDE, SODIUM LACTATE AND CALCIUM CHLORIDE 1000 ML: 600; 310; 30; 20 INJECTION, SOLUTION INTRAVENOUS at 08:04

## 2025-04-06 VITALS
SYSTOLIC BLOOD PRESSURE: 114 MMHG | BODY MASS INDEX: 30.7 KG/M2 | WEIGHT: 191 LBS | TEMPERATURE: 98 F | OXYGEN SATURATION: 100 % | HEIGHT: 66 IN | HEART RATE: 61 BPM | DIASTOLIC BLOOD PRESSURE: 70 MMHG | RESPIRATION RATE: 20 BRPM

## 2025-04-06 NOTE — FIRST PROVIDER EVALUATION
"Medical screening examination initiated.  I have conducted a focused provider triage encounter, findings are as follows:    Brief history of present illness:  24 year old female presents to ER with c/o worsening N/V x 2 days. Patient approximately 10 weeks pregnant, . Also, reports generalized abdominal pain onset today. Patient reports she was seen here last week for same. Denies vaginal bleeding. OB: Dr. Villeda      Vitals:    25   BP: 124/62   BP Location: Left arm   Pulse: 96   Resp: 16   Temp: 98 °F (36.7 °C)   TempSrc: Oral   SpO2: 97%   Weight: 86.6 kg (191 lb)   Height: 5' 6" (1.676 m)       Pertinent physical exam:  awake and alert, nad    Brief workup plan:  labs, UA    Preliminary workup initiated; this workup will be continued and followed by the physician or advanced practice provider that is assigned to the patient when roomed.  "

## 2025-04-06 NOTE — ED PROVIDER NOTES
Encounter Date: 2025       History     Chief Complaint   Patient presents with    Emesis During Pregnancy     Pt states that she has been experiencing n/v for 2 days. Pt states that she is 10wks pregnant. +chills. Pt states she has been taking antimetics with no relief. Pt complains of upper quadrant abd pain for x1 day. Pt states that she is having scant bright red bleeding during bowel movements. Last dose of reglan at 1000. Denies vaginal bleeding.      See MDM    The history is provided by the patient. No  was used.     Review of patient's allergies indicates:  No Known Allergies  Past Medical History:   Diagnosis Date    Known health problems: none      Past Surgical History:   Procedure Laterality Date    ANKLE SURGERY       Family History   Problem Relation Name Age of Onset    Hypertension Mother      Cancer Mother      Hypertension Father      Stroke Maternal Grandmother      Cancer Maternal Grandmother      Seizures Maternal Grandmother      Hypertension Paternal Grandmother       Social History[1]  Review of Systems   Constitutional:  Negative for fever.   Respiratory:  Negative for cough and shortness of breath.    Cardiovascular:  Negative for chest pain.   Gastrointestinal:  Positive for nausea and vomiting. Negative for abdominal pain.   Genitourinary:  Negative for difficulty urinating and dysuria.   Musculoskeletal:  Negative for gait problem.   Skin:  Negative for color change.   Neurological:  Negative for dizziness, speech difficulty and headaches.   Psychiatric/Behavioral:  Negative for hallucinations and suicidal ideas.    All other systems reviewed and are negative.      Physical Exam     Initial Vitals [25]   BP Pulse Resp Temp SpO2   124/62 96 16 98 °F (36.7 °C) 97 %      MAP       --         Physical Exam    Nursing note and vitals reviewed.  Constitutional: She appears well-developed and well-nourished.   HENT:   Head: Normocephalic.   Eyes: EOM are  normal.   Neck:   Normal range of motion.  Cardiovascular:  Normal rate, regular rhythm, normal heart sounds and intact distal pulses.           Pulmonary/Chest: Breath sounds normal. No respiratory distress.   Abdominal: Abdomen is soft. Bowel sounds are normal. There is no abdominal tenderness.   Musculoskeletal:         General: Normal range of motion.      Cervical back: Normal range of motion.     Neurological: She is alert and oriented to person, place, and time. She has normal strength.   Skin: Skin is warm and dry.   Psychiatric: She has a normal mood and affect. Her behavior is normal. Judgment and thought content normal.         ED Course   Procedures  Labs Reviewed   COMPREHENSIVE METABOLIC PANEL - Abnormal       Result Value    Sodium 138      Potassium 3.5      Chloride 110 (*)     CO2 19 (*)     Glucose 80      Blood Urea Nitrogen 7.2      Creatinine 0.61      Calcium 8.3 (*)     Protein Total 7.0      Albumin 3.5      Globulin 3.5      Albumin/Globulin Ratio 1.0 (*)     Bilirubin Total 1.0      ALP 38 (*)     ALT 14      AST 15      eGFR >60      Anion Gap 9.0      BUN/Creatinine Ratio 12     URINALYSIS, REFLEX TO URINE CULTURE - Abnormal    Color, UA Light-Yellow      Appearance, UA Slightly Cloudy (*)     Specific Gravity, UA 1.025      pH, UA 6.5      Protein, UA Trace (*)     Glucose, UA Negative      Ketones, UA 3+ (*)     Blood, UA Negative      Bilirubin, UA 1+ (*)     Urobilinogen, UA 2.0 (*)     Nitrites, UA Negative      Leukocyte Esterase, UA 2+ (*)     RBC, UA 0-5      WBC, UA 11-20 (*)     Bacteria, UA Trace      Squamous Epithelial Cells, UA Occasional (*)     Mucous, UA Few (*)    CBC WITH DIFFERENTIAL - Abnormal    WBC 8.90      RBC 4.20      Hgb 12.4      Hct 36.4 (*)     MCV 86.7      MCH 29.5      MCHC 34.1      RDW 12.4      Platelet 214      MPV 12.0 (*)     Neut % 61.1      Lymph % 31.6      Mono % 5.5      Eos % 1.2      Basophil % 0.4      Imm Grans % 0.2      Neut # 5.43       Lymph # 2.81      Mono # 0.49      Eos # 0.11      Baso # 0.04      Imm Gran # 0.02      NRBC% 0.0     MAGNESIUM - Normal    Magnesium Level 1.80     CULTURE, URINE   CBC W/ AUTO DIFFERENTIAL    Narrative:     The following orders were created for panel order CBC auto differential.  Procedure                               Abnormality         Status                     ---------                               -----------         ------                     CBC with Differential[[id number]]        Abnormal            Final result                 Please view results for these tests on the individual orders.          Imaging Results    None          Medications   lactated ringers bolus 1,000 mL (0 mLs Intravenous Stopped 4/5/25 2129)   metoclopramide injection 10 mg (10 mg Intravenous Given 4/5/25 2029)   promethazine injection 25 mg (25 mg Intramuscular Given 4/5/25 2116)   lactated ringers bolus 1,000 mL (0 mLs Intravenous Stopped 4/5/25 2215)     Medical Decision Making  Historian:  Patient.  Patient is a  24 y.o. female that presents with nausea and vomiting that has been present 2 days. Associated symptoms nothing. Surrounding information is nothing. Exacerbated by nothing. Relieved by nothing. Patient treatment prior to arrival none. Risk factors include none. Other history pertaining to this complaint none.   Assessment:  See physical exam.  DD:  Morning sickness, hyperemesis gravidarum, UTI  ED Course: History was obtained.  Physical was performed.  Patient appears to have a UTI.  She is tolerating p.o. in the ER.  She would have ketones in her urine but was hydrated in the ER. Medical or surgical consults:  None. Social determinants that affect healthcare:  None.       Amount and/or Complexity of Data Reviewed  Labs:      Details: UTI    Risk  Prescription drug management.  Risk Details: Keflex and Phenergan                                      Clinical Impression:  Final diagnoses:  [O21.9] Nausea and vomiting  during pregnancy (Primary)  [N39.0] Urinary tract infection without hematuria, site unspecified          ED Disposition Condition    Discharge Stable          ED Prescriptions       Medication Sig Dispense Start Date End Date Auth. Provider    cephALEXin (KEFLEX) 500 MG capsule Take 1 capsule (500 mg total) by mouth 4 (four) times daily. for 7 days 28 capsule 4/5/2025 4/12/2025 Tree Russell FNP    promethazine (PHENERGAN) 25 MG tablet Take 1 tablet (25 mg total) by mouth every 6 (six) hours as needed for Nausea. 15 tablet 4/5/2025 -- Tree Russell FNP          Follow-up Information       Follow up With Specialties Details Why Contact Info    Your Obstetrician/Gynecologist  Call in 3 days ed follow up                  [1]   Social History  Tobacco Use    Smoking status: Never    Smokeless tobacco: Never   Substance Use Topics    Alcohol use: No    Drug use: No        Tree Russell FNP  04/05/25 0175

## 2025-04-08 LAB — BACTERIA UR CULT: NORMAL
